# Patient Record
Sex: MALE | Race: WHITE | NOT HISPANIC OR LATINO | Employment: PART TIME | URBAN - METROPOLITAN AREA
[De-identification: names, ages, dates, MRNs, and addresses within clinical notes are randomized per-mention and may not be internally consistent; named-entity substitution may affect disease eponyms.]

---

## 2017-01-25 ENCOUNTER — HOSPITAL ENCOUNTER (OUTPATIENT)
Dept: RADIOLOGY | Facility: MEDICAL CENTER | Age: 22
End: 2017-01-25
Attending: INTERNAL MEDICINE
Payer: COMMERCIAL

## 2017-01-25 ENCOUNTER — OFFICE VISIT (OUTPATIENT)
Dept: MEDICAL GROUP | Facility: MEDICAL CENTER | Age: 22
End: 2017-01-25
Payer: COMMERCIAL

## 2017-01-25 VITALS
DIASTOLIC BLOOD PRESSURE: 58 MMHG | TEMPERATURE: 99.1 F | HEIGHT: 71 IN | SYSTOLIC BLOOD PRESSURE: 122 MMHG | RESPIRATION RATE: 14 BRPM | BODY MASS INDEX: 22.71 KG/M2 | WEIGHT: 162.2 LBS | HEART RATE: 66 BPM | OXYGEN SATURATION: 97 %

## 2017-01-25 DIAGNOSIS — Z87.09 H/O PNEUMOTHORAX: ICD-10-CM

## 2017-01-25 DIAGNOSIS — R07.9 CHEST PAIN, UNSPECIFIED TYPE: ICD-10-CM

## 2017-01-25 DIAGNOSIS — Z00.00 HEALTH CARE MAINTENANCE: ICD-10-CM

## 2017-01-25 PROCEDURE — 71020 DX-CHEST-2 VIEWS: CPT

## 2017-01-25 PROCEDURE — 99213 OFFICE O/P EST LOW 20 MIN: CPT | Performed by: INTERNAL MEDICINE

## 2017-01-25 NOTE — PROGRESS NOTES
CHIEF COMPLAINT  Chief Complaint   Patient presents with   • Establish Care     lung problems   Pneumothorax    HPI  Patient is a 21 y.o. male patient who presents today for the following     H/o pneumothorax, Chest pain  He had spontaneous pneumothorax in 2014, was hospitalized from 5/23 - 5/26/2014, with thoracostomy with 24-French chest tube placement. It started as left chest pain.     C/o:   - chest pain in both lower lungs with deep inspiration   - started 2 months ago   - it has been on/off   - lasting max 15-20 min   - no change in frequency or severity, duration   - the last episode was last night.    Denies:   - Shortness of breath  - Cough  - Fever, chills.    He is concerned that he developed again pneumothorax.  He lives in [place], visiting [place].     Reviewed PMH, PSH, FH, SH, ALL, HCM/IMM, no changes  Reviewed MEDS, no changes    Patient Active Problem List    Diagnosis Date Noted   • Pneumothorax on left 05/23/2014     Priority: High   • Health care maintenance 01/25/2017     CURRENT MEDICATIONS  Current Outpatient Prescriptions   Medication Sig Dispense Refill   • cyclobenzaprine (FLEXERIL) 10 MG TABS Take 1 Tab by mouth 3 times a day as needed. 30 Tab 0     No current facility-administered medications for this visit.     ALLERGIES  Allergies: Review of patient's allergies indicates no known allergies.  PAST MEDICAL HISTORY  Past Medical History   Diagnosis Date   • Seasonal allergies 6/12/2012   • Pneumothorax      SURGICAL HISTORY  He  has no past surgical history on file.  SOCIAL HISTORY  Social History   Substance Use Topics   • Smoking status: Never Smoker    • Smokeless tobacco: Never Used   • Alcohol Use: No     Social History     Social History Narrative     FAMILY HISTORY  Family History   Problem Relation Age of Onset   • Hyperlipidemia Mother    • Hyperlipidemia Father    • Heart Disease Maternal Grandmother    • Hypertension Maternal Grandfather    • Hypertension Paternal Grandfather   "    Family Status   Relation Status Death Age   • Mother Alive    • Father Alive    • Maternal Grandmother     • Maternal Grandfather Alive    • Paternal Grandfather Alive      Health Maintenance:  Addressed; declined flu shot    ROS   Constitutional: Negative for fever, chills.  HENT: Negative for congestion, sore throat.  Eyes: Negative for blurred vision.   Respiratory: Negative for cough, shortness of breath.And per HPI.  Cardiovascular: Negative for chest pain, palpitations.   Gastrointestinal: Negative for heartburn, nausea, abdominal pain.   Genitourinary: Negative for dysuria.  Musculoskeletal: Negative for significant myalgias, back pain and joint pain.   Skin: Negative for rash and itching.   Neuro: Negative for dizziness, weakness and headaches.   Endo/Heme/Allergies: Does not bruise/bleed easily.   Psychiatric/Behavioral: Negative for depression    PHYSICAL EXAM   Blood pressure 122/58, pulse 66, temperature 37.3 °C (99.1 °F), resp. rate 14, height 1.816 m (5' 11.5\"), weight 73.573 kg (162 lb 3.2 oz), SpO2 97 %. Body mass index is 22.31 kg/(m^2).  General:  NAD, well appearing  HEENT:   NC/AT, PERRLA, EOMI, TMs are clear. Oropharyngeal mucosa is pink,  without lesions;  no cervical / supraclavicular  lymphadenopathy, no thyromegaly.    Cardiovascular: RRR.   No m/r/g. No carotid bruits .      Lungs:   CTAB, no w/r/r, no respiratory distress.  Abdomen: Soft, NT/ND + BS; no suprapubic tenderness; no masses or hepatosplenomegaly.  Extremities:  2+ DP and radial pulses bilaterally.  No c/c/e.   Skin:  Warm, dry.  No erythema. No rash.   Neurologic: Alert & oriented x 3.  No focal deficits.  Psychiatric:  Affect normal, mood normal, judgment normal.    LABS     None    IMAGING     Chest x-ray from today, reviewed by myself, consistent with a radiology:  No cardiopulmonary process.    ASSESMENT AND PLAN        1. H/O pneumothorax  Physical exam was benign.   Chest x-ray was negative, for which the " patient was notified.   - DX-CHEST-2 VIEWS; Future    2. Chest pain, unspecified type  - DX-CHEST-2 VIEWS; Future    3. Health care maintenance  Declined flu shot.     Advised to find a PCP in [place], where he lives.    Counseling:   - Smoking:  Nonsmoker    Followup: Return if symptoms worsen or fail to improve.    All questions are answered.    Please note that this dictation was created using voice recognition software, and that there might be errors of shadi and possibly content.

## 2017-01-25 NOTE — MR AVS SNAPSHOT
"        Rivas Beckford   2017 8:40 AM   Office Visit   MRN: [id number]    Department:  [place] [address]   Dept Phone:  [phone]    Description:  Male : 1995   Provider:  Guerrero Dominguez M.D.           Reason for Visit     Establish Care lung problems      Allergies as of 2017     No Known Allergies      You were diagnosed with     H/O pneumothorax   [234324]       Chest pain, unspecified type   [7632284]       Health care maintenance   [[id number]]         Vital Signs     Blood Pressure Pulse Temperature Respirations Height Weight    122/58 mmHg 66 37.3 °C (99.1 °F) 14 1.816 m (5' 11.5\") 73.573 kg (162 lb 3.2 oz)    Body Mass Index Oxygen Saturation Smoking Status             22.31 kg/m2 97% Never Smoker          Basic Information     Date Of Birth Sex Race Ethnicity Preferred Language    1995 Male White Unknown English      Problem List              ICD-10-CM Priority Class Noted - Resolved    Pneumothorax on left J93.9 High  2014 - Present    Health care maintenance Z00.00   2017 - Present      Health Maintenance        Date Due Completion Dates    IMM HEP B VACCINE (1 of 3 - Primary Series) 1996 ---    IMM HEP A VACCINE (1 of 2 - Standard Series) 1997 ---    IMM HPV VACCINE (1 of 3 - Male 3 Dose Series) 2007 ---    IMM VARICELLA (CHICKENPOX) VACCINE (1 of 2 - 2 Dose Adolescent Series) 2009 ---    IMM MENINGOCOCCAL VACCINE (MCV4) (1 of 1) 2012 ---    IMM DTaP/Tdap/Td Vaccine (1 - Tdap) 2015 ---    IMM INFLUENZA (1) 2016 ---            Current Immunizations     No immunizations on file.      Below and/or attached are the medications your provider expects you to take. Review all of your home medications and newly ordered medications with your provider and/or pharmacist. Follow medication instructions as directed by your provider and/or pharmacist. Please keep your medication list with you and share with your provider. Update the information when " medications are discontinued, doses are changed, or new medications (including over-the-counter products) are added; and carry medication information at all times in the event of emergency situations     Allergies:  No Known Allergies          Medications  Valid as of: January 25, 2017 -  9:24 AM    Generic Name Brand Name Tablet Size Instructions for use    Cyclobenzaprine HCl (Tab) FLEXERIL 10 MG Take 1 Tab by mouth 3 times a day as needed.        .                 Medicines prescribed today were sent to:     [facility] # [place], [place] - [address] [address]    [address] [address]    Phone: [phone] Fax: [phone]    Open 24 Hours?: No      Medication refill instructions:       If your prescription bottle indicates you have medication refills left, it is not necessary to call your provider’s office. Please contact your pharmacy and they will refill your medication.    If your prescription bottle indicates you do not have any refills left, you may request refills at any time through one of the following ways: The online [facility] system (except Urgent Care), by calling your provider’s office, or by asking your pharmacy to contact your provider’s office with a refill request. Medication refills are processed only during regular business hours and may not be available until the next business day. Your provider may request additional information or to have a follow-up visit with you prior to refilling your medication.   *Please Note: Medication refills are assigned a new Rx number when refilled electronically. Your pharmacy may indicate that no refills were authorized even though a new prescription for the same medication is available at the pharmacy. Please request the medicine by name with the pharmacy before contacting your provider for a refill.        Your To Do List     Future Labs/Procedures Complete By Expires    DX-CHEST-2 VIEWS  As directed 7/28/2017         [facility] Access  Code: Activation code not generated  Current MyChart Status: Active